# Patient Record
(demographics unavailable — no encounter records)

---

## 2024-12-31 NOTE — ASSESSMENT
[FreeTextEntry1] : 58 year M with PMHx of Gout, HTN presenting for BPH and ED  BPH  - PVR 2 cc r/o reetneiotn - UA UCx PSA - 1 year f/u   ED  - trial tadalafil 20 mg

## 2024-12-31 NOTE — PHYSICAL EXAM
[Normal Appearance] : normal appearance [General Appearance - In No Acute Distress] : no acute distress [Edema] : no peripheral edema [] : no respiratory distress [Exaggerated Use Of Accessory Muscles For Inspiration] : no accessory muscle use [Abdomen Soft] : soft [Abdomen Tenderness] : non-tender [Abdomen Hernia] : no hernia was discovered [Urethral Meatus] : meatus normal [Penis Abnormality] : normal uncircumcised penis [Scrotum] : the scrotum was normal [Testes Tenderness] : no tenderness of the testes [Testes Mass (___cm)] : there were no testicular masses [Rectal Exam - Rectum] : digital rectal exam was normal [Prostate Tenderness] : the prostate was not tender [Normal Station and Gait] : the gait and station were normal for the patient's age [Oriented To Time, Place, And Person] : oriented to person, place, and time [Affect] : the affect was normal [Chaperone Declined] : Patient declined chaperone [de-identified] : Prostate snmooth symmetrical nontender, testicle 18 cc b/l nontender and smooth  [FreeTextEntry3] : A chaperone was offered to accompany the patient during the physical examination, patient was informed chaperone would be readily available to oversee the exam. Patient refused a chaperone at this time and proceeded to exam.

## 2024-12-31 NOTE — END OF VISIT
[FreeTextEntry3] : I, Dr. Adan, personally performed the evaluation and management (E/M) services for this new patient.  That E/M includes conducting the clinically appropriate initial history &/or exam, assessing all conditions, and establishing the plan of care.  Today, my MARISA, Fabio Carranza, was here to observe my evaluation and management service for this patient & follow plan of care established by me going forward.

## 2024-12-31 NOTE — PHYSICAL EXAM
[Normal Appearance] : normal appearance [General Appearance - In No Acute Distress] : no acute distress [Edema] : no peripheral edema [] : no respiratory distress [Exaggerated Use Of Accessory Muscles For Inspiration] : no accessory muscle use [Abdomen Soft] : soft [Abdomen Tenderness] : non-tender [Abdomen Hernia] : no hernia was discovered [Urethral Meatus] : meatus normal [Penis Abnormality] : normal uncircumcised penis [Scrotum] : the scrotum was normal [Testes Tenderness] : no tenderness of the testes [Testes Mass (___cm)] : there were no testicular masses [Rectal Exam - Rectum] : digital rectal exam was normal [Prostate Tenderness] : the prostate was not tender [Normal Station and Gait] : the gait and station were normal for the patient's age [Oriented To Time, Place, And Person] : oriented to person, place, and time [Affect] : the affect was normal [Chaperone Declined] : Patient declined chaperone [de-identified] : Prostate snmooth symmetrical nontender, testicle 18 cc b/l nontender and smooth  [FreeTextEntry3] : A chaperone was offered to accompany the patient during the physical examination, patient was informed chaperone would be readily available to oversee the exam. Patient refused a chaperone at this time and proceeded to exam.

## 2024-12-31 NOTE — HISTORY OF PRESENT ILLNESS
[FreeTextEntry1] : JALEN MEREDITH is a 58 year M with PMHx of Gout, HTN presenting for BPH and ED on 12/31/2024  PSHx none   He reports - some urinary discomfort, dysuria mostly at night  - nocturia x 2-3  -   He denies Hematuria dysuria n/v/d flank pain fever chills    Social history; 2 PPD x 20 years, quit 15 years ago, social ETOH    Family history: Paternal colon ca    Allergies: NKDA   Last PSA 0.55 1/2024